# Patient Record
Sex: FEMALE | Race: WHITE | NOT HISPANIC OR LATINO | Employment: OTHER | URBAN - METROPOLITAN AREA
[De-identification: names, ages, dates, MRNs, and addresses within clinical notes are randomized per-mention and may not be internally consistent; named-entity substitution may affect disease eponyms.]

---

## 2017-02-09 ENCOUNTER — CONVERSION ENCOUNTER (OUTPATIENT)
Dept: GENERAL RADIOLOGY | Facility: HOSPITAL | Age: 47
End: 2017-02-09

## 2018-02-06 ENCOUNTER — OFFICE VISIT CONVERTED (OUTPATIENT)
Dept: FAMILY MEDICINE CLINIC | Facility: CLINIC | Age: 48
End: 2018-02-06
Attending: NURSE PRACTITIONER

## 2018-11-01 ENCOUNTER — OFFICE VISIT CONVERTED (OUTPATIENT)
Dept: FAMILY MEDICINE CLINIC | Facility: CLINIC | Age: 48
End: 2018-11-01
Attending: NURSE PRACTITIONER

## 2018-11-01 ENCOUNTER — CONVERSION ENCOUNTER (OUTPATIENT)
Dept: FAMILY MEDICINE CLINIC | Facility: CLINIC | Age: 48
End: 2018-11-01

## 2019-01-15 ENCOUNTER — HOSPITAL ENCOUNTER (OUTPATIENT)
Dept: GENERAL RADIOLOGY | Facility: HOSPITAL | Age: 49
Discharge: HOME | End: 2019-01-15
Attending: NURSE PRACTITIONER

## 2020-03-23 ENCOUNTER — OFFICE VISIT CONVERTED (OUTPATIENT)
Dept: FAMILY MEDICINE CLINIC | Facility: CLINIC | Age: 50
End: 2020-03-23
Attending: NURSE PRACTITIONER

## 2020-08-12 ENCOUNTER — OFFICE VISIT CONVERTED (OUTPATIENT)
Dept: FAMILY MEDICINE CLINIC | Facility: CLINIC | Age: 50
End: 2020-08-12
Attending: PHYSICIAN ASSISTANT

## 2020-11-19 ENCOUNTER — HOSPITAL ENCOUNTER (OUTPATIENT)
Dept: GENERAL RADIOLOGY | Facility: HOSPITAL | Age: 50
Discharge: HOME | End: 2020-11-19
Attending: PHYSICIAN ASSISTANT

## 2020-12-01 ENCOUNTER — HOSPITAL ENCOUNTER (OUTPATIENT)
Dept: GENERAL RADIOLOGY | Facility: HOSPITAL | Age: 50
Discharge: HOME | End: 2020-12-01
Attending: NURSE PRACTITIONER

## 2021-03-16 ENCOUNTER — OFFICE VISIT CONVERTED (OUTPATIENT)
Dept: SURGERY | Facility: CLINIC | Age: 51
End: 2021-03-16
Attending: NURSE PRACTITIONER

## 2021-04-07 ENCOUNTER — HOSPITAL ENCOUNTER (OUTPATIENT)
Dept: GASTROENTEROLOGY | Facility: HOSPITAL | Age: 51
Setting detail: HOSPITAL OUTPATIENT SURGERY
Discharge: HOME | End: 2021-04-07
Attending: SURGERY

## 2021-05-10 NOTE — H&P
History and Physical      Patient Name: Pamela Golden   Patient ID: [id number]   Sex: Female   YOB: 1970    Primary Care Provider: Delores AUGUST   Referring Provider: Cathie KAPADIA    Visit Date: March 16, 2021    Provider: STEFFI Troncoso   Location: [facility]   Location Address: [address] [place] [address]  [id number]   Location Phone: [phone]          Chief Complaint  · Requesting colonoscopy  · Age 50 or over  · FH of colon cancer  · Here today for a pre-surgical colon screening visit      History Of Present Illness  The patient is a 51 year old /White female presenting to the Surgical Specialist office on a referral from Cathie KAPADIA.   Pamela Golden needs to have a screening colonoscopy.   Patient states that they have not had a colonoscopy.   Patient currently complains of: no complaints   Patient Does have family history of colon cancer. Father      Patient presents today on referral from Cathie Cooley for screening colonoscopy.  Patient denies any abdominal pain, change in bowel habit, or rectal bleeding.  Admits to family history of colon cancer with her father.  No previous colonoscopy.    In early December patient reports that she was treated for diverticulitis.       Past Medical History  Disease Name Date Onset Notes   Anxiety --  --    Depression --  --    Migraine --  --          Past Surgical History  Procedure Name Date Notes   Ablation 2009 --    Bunionectomy 2010, 2002 --    Dilation and curretage 2009 --    Hysterectomy 2011 --          Medication List  Name Date Started Instructions   estrogens-methyltestosterone 0.625-1.25 mg oral tablet  --          Allergy List  Allergen Name Date Reaction Notes   NO KNOWN DRUG ALLERGIES --  --  --    Seasonal --  --  --        Allergies Reconciled  Family Medical History  Disease Name Relative/Age Notes   Skin Carcinoma In Situ Mother/   --    Depression Mother/   --   "  Diabetes, unspecified type  --    Hypertension Father/   --    Brain tumor Father/   --    Family history of colon cancer Father/70s   --          Reproductive History  Menstrual   Age Menarche: 14   Pregnancy Summary   Total Pregnancies: 0 Full Term: 0 Premature: 0   Ab Induced: 0 Ab Spontaneous: 0 Ectopics: 0   Multiples: 0 Livin         Social History  Finding Status Start/Stop Quantity Notes   Alcohol Current some day --/-- 3 drinks/week --     --  --/-- --  --    Minimal Amount of Exercise (Once weekly or less) --  --/-- --  --    No known infection risk --  --/-- --  --    Tobacco Never --/-- --  2017 - never          Review of Systems  · Constitutional  o Denies  o : fever, chills  · Eyes  o Denies  o : yellowish discoloration of eyes  · HENT  o Denies  o : difficulty swallowing  · Cardiovascular  o Denies  o : chest pain, chest pain on exertion  · Respiratory  o Denies  o : shortness of breath  · Gastrointestinal  o Denies  o : nausea, vomiting, diarrhea, constipation  · Genitourinary  o Denies  o : abnormal color of urine  · Integument  o Denies  o : rash  · Neurologic  o Denies  o : tingling or numbness  · Musculoskeletal  o Denies  o : joint pain  · Endocrine  o Denies  o : weight gain, weight loss      Vitals  Date Time BP Position Site L\R Cuff Size HR RR TEMP (F) WT  HT  BMI kg/m2 BSA m2 O2 Sat FR L/min FiO2 HC       2021 09:31 AM       14  246lbs 3oz 5'  10\" 35.32 2.35             Physical Examination  · Constitutional  o Appearance  o : well developed, well-nourished, patient in no apparent distress  · Head and Face  o Head  o :   § Inspection  § : atraumatic, normocephalic  o Face  o :   § Inspection  § : no facial lesions  · Eyes  o Conjunctivae  o : conjunctivae normal  o Sclerae  o : sclerae white  · Neck  o Inspection/Palpation  o : normal appearance, no masses or tenderness, trachea midline  · Respiratory  o Respiratory Effort  o : breathing unlabored  · Skin and " Subcutaneous Tissue  o General Inspection  o : no lesions present, no areas of discoloration, skin turgor normal, texture normal  · Neurologic  o Mental Status Examination  o :   § Orientation  § : grossly oriented to person, place and time  § Attention  § : attention normal, concentration abilities normal  § Fund of Knowledge  § : fund of knowledge within normal limits, patient aware of current events  o Gait and Station  o : normal gait, able to stand without difficulty  · Psychiatric  o Judgement and Insight  o : judgment and insight intact  o Mood and Affect  o : mood normal, affect appropriate              Assessment  · Family History of Colon Cancer     V16.0/Z80.0  · Screening for colon cancer     V76.51/Z12.11    Problems Reconciled  Plan  · Orders  o Consent for Colonoscopy Screening-Possible risk/complications, benefits, and alternatives to surgical or invasive procedure have been explained to patient and/or legal guardian. -Patient has been evaluated and can tolerate anesthesia and/or sedation. Risks, benefits, and alternatives to anesthesia and sedation have been explained to patient and/or legal guardian. () - V76.51/Z12.11, V16.0/Z80.0 - 04/07/2021  · Medications  o Medications have been Reconciled  o Transition of Care or Provider Policy  · Instructions  o Surgical Facility: [facility]  o Handouts Provided Pre-Procedure Instructions including date, time, and location of procedure.   o PLAN: Proceeed with colonoscopy. Patient understands risks/benefits and is willing to proceed.   o ***Surgical Orders***  o RISK AND BENEFITS:  o Given these options, the patient has verbally expressed an understanding of the risks of the surgery and finds these risks acceptable. Will proceed with surgery as soon as possible.  o O.R. PREP: Per protocol   o IV: Per Anesthesia  o Please sign permit for: Colonoscopy with possible biopsies by Dr. Ayala.   o The above History and Physical Examination has  been completed within 30 days of admission.  o ***Patient Status***  o Outpatient  o Follow up in the in the office post procedure.  o Electronically Identified Patient Education Materials Provided Electronically            Electronically Signed by: STEFFI Troncoso -Author on March 16, 2021 09:41:45 AM

## 2021-05-13 NOTE — PROGRESS NOTES
Progress Note      Patient Name: Pamela Golden   Patient ID: [id number]   Sex: Female   YOB: 1970    Primary Care Provider: Delores AUGUST   Referring Provider: Delores AUGUST    Visit Date: 2020    Provider: MATA Isaac   Location: [facility]   Location Address: [address] [place] [address], [address]  [place], [place]  [id number]   Location Phone: [phone]          Chief Complaint  · tick bit on right hip       History Of Present Illness  Pamela Golden is a 50 year old /White female who presents for evaluation and treatment of:      patient states she noticed tick on right hip last night. She thinks she removed everything but just wants it looked at. She states it was very tiny. She denies rash, fever, chills, myalgias at present.    Requesting MMG as she is overdue; she does not do SBE.       Past Medical History  Disease Name Date Onset Notes   Anxiety --  --    Depression --  --    Migraine --  --          Past Surgical History  Procedure Name Date Notes   Ablation  --    Bunionectomy 2002 --    Dilation and curretage  --    Hysterectomy  --          Allergy List  Allergen Name Date Reaction Notes   NO KNOWN DRUG ALLERGIES --  --  --    Seasonal --  --  --        Allergies Reconciled  Family Medical History  Disease Name Relative/Age Notes   Skin Carcinoma In Situ Mother/   --    Colon Neoplasm Father/   --    Depression Mother/   --    Diabetes, unspecified type  --    Hypertension Father/   --    Brain tumor Father/   --          Reproductive History  Menstrual   Age Menarche: 14   Pregnancy Summary   Total Pregnancies: 0 Full Term: 0 Premature: 0   Ab Induced: 0 Ab Spontaneous: 0 Ectopics: 0   Multiples: 0 Livin         Social History  Finding Status Start/Stop Quantity Notes   Alcohol Current some day --/-- 3 drinks/week --     --  --/-- --  --    Minimal Amount of Exercise (Once weekly or less) --  --/-- --  --   "  No known infection risk --  --/-- --  --    Tobacco Never --/-- --  11/30/2017 - never          Immunizations  NameDate Admin Mfg Trade Name Lot Number Route Inj VIS Given VIS Publication   Hepatitis A01/27/2016 SK[facility] HAVRIX-ADULT [id number] IM LA 01/27/2016 10/25/2011   Comments: TOLERATED WELL.   Isuqssaet93/01/2018 [facility] Fluzone Quadrivalent [id number] IM RD 11/01/2018 08/07/2015   Comments: tolerated well   Lkglmnhrv99/27/2016 SKB Fluarix, quadrivalent, preservative free [id number] IM RA 01/27/2016 01/01/2015   Comments: TOLERATED WELL.   Tdap01/27/2016 SKB BOOSTRIX [id number] IM RA 01/27/2016 01/24/2012   Comments: Tolerated well.         Review of Systems  · Constitutional  o Denies  o : fever, fatigue, weight loss, weight gain  · Cardiovascular  o Denies  o : lower extremity edema, claudication, chest pressure, palpitations  · Respiratory  o Denies  o : shortness of breath, wheezing, cough, hemoptysis, dyspnea on exertion  · Gastrointestinal  o Denies  o : nausea, vomiting, diarrhea, constipation, abdominal pain  · Integument  o Denies  o : rash      Vitals  Date Time BP Position Site L\R Cuff Size HR RR TEMP (F) WT  HT  BMI kg/m2 BSA m2 O2 Sat        08/12/2020 01:56 /75 Sitting    77 - R  98.2 203lbs 16oz 5'  10\" 29.27 2.14 99 %          Physical Examination  · Constitutional  o Appearance  o : well developed, well-nourished, no acute distress  · Head and Face  o Head  o : normocephalic, atraumatic  · Neck  o Inspection/Palpation  o : normal appearance, no masses or tenderness, trachea midline  o Thyroid  o : gland size normal, nontender, no nodules or masses present on palpation  · Respiratory  o Respiratory Effort  o : breathing unlabored  o Inspection of Chest  o : chest rise symmetric bilaterally  o Auscultation of Lungs  o : clear to auscultation bilaterally throughout inspiration and expiration  · Cardiovascular  o Heart  o :   § Auscultation of Heart  § : regular rate and rhythm, no murmurs, gallops or " rubs  o Peripheral Vascular System  o :   § Extremities  § : no edema  · Lymphatic  o Neck  o : no cervical lymphadenopathy, no supraclavicular lymphadenopathy  · Psychiatric  o Mood and Affect  o : mood normal, affect appropriate     Skin: small quarter sized area of redness on right hip area without swelling.               Assessment  · Visit for screening mammogram     V76.12/Z12.31  · Tick bite       Bitten or stung by nonvenomous insect and other nonvenomous arthropods, initial encounter     919.4/W57.XXXA  Advised to monitor for sx including rash, arthralgia, fever, diarrhea, headache. Use repellant when outside.    Problems Reconciled  Plan  · Orders  o Screening Mammography; Bilateral 3D (68289, , 04401) - V76.12/Z12.31 - 08/12/2020  o ACO-39: Current medications updated and reviewed () - - 08/12/2020  · Medications  o Medications have been Reconciled  o Transition of Care or Provider Policy  · Instructions  o Patient was educated/instructed on their diagnosis, treatment and medications prior to discharge from the clinic today.  o Electronically Identified Patient Education Materials Provided Electronically  · Disposition  o Call or Return if symptoms worsen or persist.            Electronically Signed by: MATA Isaac -Author on August 12, 2020 02:47:07 PM

## 2021-05-14 VITALS — WEIGHT: 246.19 LBS | HEIGHT: 70 IN | BODY MASS INDEX: 35.24 KG/M2 | RESPIRATION RATE: 14 BRPM

## 2021-05-15 VITALS
BODY MASS INDEX: 29.2 KG/M2 | DIASTOLIC BLOOD PRESSURE: 75 MMHG | HEART RATE: 77 BPM | OXYGEN SATURATION: 99 % | TEMPERATURE: 98.2 F | WEIGHT: 204 LBS | HEIGHT: 70 IN | SYSTOLIC BLOOD PRESSURE: 126 MMHG

## 2021-05-15 VITALS
TEMPERATURE: 98.7 F | WEIGHT: 216 LBS | DIASTOLIC BLOOD PRESSURE: 80 MMHG | OXYGEN SATURATION: 98 % | HEART RATE: 103 BPM | SYSTOLIC BLOOD PRESSURE: 112 MMHG

## 2021-05-16 VITALS
DIASTOLIC BLOOD PRESSURE: 66 MMHG | WEIGHT: 216.25 LBS | BODY MASS INDEX: 30.27 KG/M2 | HEIGHT: 71 IN | HEART RATE: 74 BPM | SYSTOLIC BLOOD PRESSURE: 116 MMHG

## 2021-05-16 VITALS
HEART RATE: 74 BPM | HEIGHT: 71 IN | TEMPERATURE: 97.5 F | BODY MASS INDEX: 29.68 KG/M2 | OXYGEN SATURATION: 100 % | WEIGHT: 212 LBS

## 2021-11-04 ENCOUNTER — TRANSCRIBE ORDERS (OUTPATIENT)
Dept: ADMINISTRATIVE | Facility: HOSPITAL | Age: 51
End: 2021-11-04

## 2021-11-04 DIAGNOSIS — Z92.89 HISTORY OF MAMMOGRAPHY, SCREENING: Primary | ICD-10-CM

## 2022-01-27 ENCOUNTER — HOSPITAL ENCOUNTER (OUTPATIENT)
Dept: MAMMOGRAPHY | Facility: HOSPITAL | Age: 52
Discharge: HOME | End: 2022-01-27
Admitting: NURSE PRACTITIONER

## 2022-01-27 DIAGNOSIS — Z92.89 HISTORY OF MAMMOGRAPHY, SCREENING: ICD-10-CM

## 2022-01-27 PROCEDURE — 77067 SCR MAMMO BI INCL CAD: CPT

## 2022-01-27 PROCEDURE — 77063 BREAST TOMOSYNTHESIS BI: CPT

## 2022-10-19 ENCOUNTER — TRANSCRIBE ORDERS (OUTPATIENT)
Dept: ADMINISTRATIVE | Facility: HOSPITAL | Age: 52
End: 2022-10-19

## 2022-10-19 DIAGNOSIS — Z13.9 SCREENING DUE: Primary | ICD-10-CM

## 2023-01-03 ENCOUNTER — HOSPITAL ENCOUNTER (OUTPATIENT)
Dept: CT IMAGING | Facility: HOSPITAL | Age: 53
Discharge: HOME | End: 2023-01-03
Admitting: NURSE PRACTITIONER
Payer: COMMERCIAL

## 2023-01-03 ENCOUNTER — TRANSCRIBE ORDERS (OUTPATIENT)
Dept: ADMINISTRATIVE | Facility: HOSPITAL | Age: 53
End: 2023-01-03
Payer: COMMERCIAL

## 2023-01-03 DIAGNOSIS — R10.31 RLQ ABDOMINAL PAIN: ICD-10-CM

## 2023-01-03 DIAGNOSIS — R10.31 RLQ ABDOMINAL PAIN: Primary | ICD-10-CM

## 2023-01-03 PROCEDURE — 74177 CT ABD & PELVIS W/CONTRAST: CPT

## 2023-01-03 PROCEDURE — 0 IOPAMIDOL PER 1 ML: Performed by: NURSE PRACTITIONER

## 2023-01-03 RX ADMIN — IOPAMIDOL 100 ML: 755 INJECTION, SOLUTION INTRAVENOUS at 17:31

## 2023-03-27 ENCOUNTER — HOSPITAL ENCOUNTER (OUTPATIENT)
Dept: MAMMOGRAPHY | Facility: HOSPITAL | Age: 53
Discharge: HOME | End: 2023-03-27
Admitting: NURSE PRACTITIONER
Payer: COMMERCIAL

## 2023-03-27 DIAGNOSIS — Z13.9 SCREENING DUE: ICD-10-CM

## 2023-03-27 PROCEDURE — 77067 SCR MAMMO BI INCL CAD: CPT

## 2023-03-27 PROCEDURE — 77063 BREAST TOMOSYNTHESIS BI: CPT

## 2023-08-02 ENCOUNTER — OFFICE VISIT (OUTPATIENT)
Dept: NEUROSURGERY | Facility: CLINIC | Age: 53
End: 2023-08-02
Payer: COMMERCIAL

## 2023-08-02 ENCOUNTER — PATIENT ROUNDING (BHMG ONLY) (OUTPATIENT)
Dept: NEUROLOGY | Facility: CLINIC | Age: 53
End: 2023-08-02
Payer: COMMERCIAL

## 2023-08-02 VITALS
BODY MASS INDEX: 22.71 KG/M2 | WEIGHT: 158.6 LBS | HEART RATE: 81 BPM | SYSTOLIC BLOOD PRESSURE: 112 MMHG | DIASTOLIC BLOOD PRESSURE: 61 MMHG | HEIGHT: 70 IN

## 2023-08-02 DIAGNOSIS — M47.27 OSTEOARTHRITIS OF SPINE WITH RADICULOPATHY, LUMBOSACRAL REGION: Primary | ICD-10-CM

## 2023-08-02 RX ORDER — ESTRADIOL 1 MG/1
1 TABLET ORAL DAILY
COMMUNITY
Start: 2023-07-13

## 2023-08-02 RX ORDER — TIRZEPATIDE 7.5 MG/.5ML
INJECTION, SOLUTION SUBCUTANEOUS
COMMUNITY
Start: 2022-08-01

## 2023-11-25 DIAGNOSIS — M47.27 OSTEOARTHRITIS OF SPINE WITH RADICULOPATHY, LUMBOSACRAL REGION: ICD-10-CM

## 2023-11-28 NOTE — ADDENDUM NOTE
Addended by: INDY ALATORRE on: 11/28/2023 12:51 PM     Modules accepted: Orders, Level of Service

## 2024-03-25 ENCOUNTER — TRANSCRIBE ORDERS (OUTPATIENT)
Dept: ADMINISTRATIVE | Facility: HOSPITAL | Age: 54
End: 2024-03-25
Payer: COMMERCIAL

## 2024-03-25 DIAGNOSIS — Z13.9 SCREENING DUE: Primary | ICD-10-CM

## 2024-05-29 ENCOUNTER — HOSPITAL ENCOUNTER (OUTPATIENT)
Dept: MAMMOGRAPHY | Facility: HOSPITAL | Age: 54
Discharge: HOME | End: 2024-05-29
Admitting: NURSE PRACTITIONER
Payer: COMMERCIAL

## 2024-05-29 DIAGNOSIS — Z13.9 SCREENING DUE: ICD-10-CM

## 2024-05-29 PROCEDURE — 77067 SCR MAMMO BI INCL CAD: CPT

## 2024-05-29 PROCEDURE — 77063 BREAST TOMOSYNTHESIS BI: CPT

## 2025-04-15 ENCOUNTER — TRANSCRIBE ORDERS (OUTPATIENT)
Dept: ADMINISTRATIVE | Facility: HOSPITAL | Age: 55
End: 2025-04-15
Payer: COMMERCIAL

## 2025-04-15 DIAGNOSIS — Z12.31 BREAST CANCER SCREENING BY MAMMOGRAM: Primary | ICD-10-CM

## 2025-06-06 ENCOUNTER — HOSPITAL ENCOUNTER (OUTPATIENT)
Dept: MAMMOGRAPHY | Facility: HOSPITAL | Age: 55
Discharge: HOME | End: 2025-06-06
Admitting: NURSE PRACTITIONER
Payer: COMMERCIAL

## 2025-06-06 DIAGNOSIS — Z12.31 BREAST CANCER SCREENING BY MAMMOGRAM: ICD-10-CM

## 2025-06-06 PROCEDURE — 77067 SCR MAMMO BI INCL CAD: CPT

## 2025-06-06 PROCEDURE — 77063 BREAST TOMOSYNTHESIS BI: CPT
